# Patient Record
Sex: MALE | Race: WHITE | NOT HISPANIC OR LATINO | Employment: FULL TIME | URBAN - METROPOLITAN AREA
[De-identification: names, ages, dates, MRNs, and addresses within clinical notes are randomized per-mention and may not be internally consistent; named-entity substitution may affect disease eponyms.]

---

## 2019-10-28 ENCOUNTER — OFFICE VISIT CONVERTED (OUTPATIENT)
Dept: ORTHOPEDIC SURGERY | Facility: CLINIC | Age: 59
End: 2019-10-28
Attending: ORTHOPAEDIC SURGERY

## 2021-05-15 VITALS — WEIGHT: 201.12 LBS | OXYGEN SATURATION: 98 % | HEART RATE: 84 BPM | HEIGHT: 68 IN | BODY MASS INDEX: 30.48 KG/M2

## 2022-01-14 ENCOUNTER — OFFICE VISIT (OUTPATIENT)
Dept: ORTHOPEDIC SURGERY | Facility: CLINIC | Age: 62
End: 2022-01-14

## 2022-01-14 VITALS — BODY MASS INDEX: 32.43 KG/M2 | HEIGHT: 68 IN | HEART RATE: 64 BPM | WEIGHT: 214 LBS | OXYGEN SATURATION: 98 %

## 2022-01-14 DIAGNOSIS — M25.512 LEFT SHOULDER PAIN, UNSPECIFIED CHRONICITY: Primary | ICD-10-CM

## 2022-01-14 PROCEDURE — 99213 OFFICE O/P EST LOW 20 MIN: CPT | Performed by: ORTHOPAEDIC SURGERY

## 2022-01-14 PROCEDURE — 20610 DRAIN/INJ JOINT/BURSA W/O US: CPT | Performed by: ORTHOPAEDIC SURGERY

## 2022-01-14 RX ORDER — ASPIRIN 81 MG/1
81 TABLET ORAL DAILY
COMMUNITY

## 2022-01-14 RX ORDER — TRIAMCINOLONE ACETONIDE 40 MG/ML
40 INJECTION, SUSPENSION INTRA-ARTICULAR; INTRAMUSCULAR
Status: COMPLETED | OUTPATIENT
Start: 2022-01-14 | End: 2022-01-14

## 2022-01-14 RX ORDER — BUSPIRONE HYDROCHLORIDE 10 MG/1
10 TABLET ORAL AS NEEDED
COMMUNITY
Start: 2021-11-27

## 2022-01-14 RX ORDER — OLMESARTAN MEDOXOMIL AND HYDROCHLOROTHIAZIDE 40/12.5 40; 12.5 MG/1; MG/1
1 TABLET ORAL DAILY
COMMUNITY

## 2022-01-14 RX ORDER — BUPIVACAINE HYDROCHLORIDE 2.5 MG/ML
5 INJECTION, SOLUTION INFILTRATION; PERINEURAL
Status: COMPLETED | OUTPATIENT
Start: 2022-01-14 | End: 2022-01-14

## 2022-01-14 RX ORDER — OMEPRAZOLE 20 MG/1
20 CAPSULE, DELAYED RELEASE ORAL AS NEEDED
COMMUNITY
Start: 2021-11-27

## 2022-01-14 RX ADMIN — BUPIVACAINE HYDROCHLORIDE 5 ML: 2.5 INJECTION, SOLUTION INFILTRATION; PERINEURAL at 14:38

## 2022-01-14 RX ADMIN — TRIAMCINOLONE ACETONIDE 40 MG: 40 INJECTION, SUSPENSION INTRA-ARTICULAR; INTRAMUSCULAR at 14:38

## 2022-01-14 NOTE — PROGRESS NOTES
"Chief Complaint  Pain of the Left Shoulder     Subjective      Rommel Cadet presents to [facility] for an evaluation of left shoulder. Patient states he has pain in the shoulder that radiates down the upper arm. He has pain with internal and external rotation. He has had on and off pain for 4-5 months. He denies any injury or trauma to the shoulder.     No Known Allergies     Social History     Socioeconomic History   • Marital status:    Tobacco Use   • Smoking status: Former Smoker   • Smokeless tobacco: Never Used   Vaping Use   • Vaping Use: Never used        Review of Systems     Objective   Vital Signs:   Pulse 64   Ht 172.7 cm (68\")   Wt 97.1 kg (214 lb)   SpO2 98%   BMI 32.54 kg/m²       Physical Exam  Constitutional:       Appearance: Normal appearance. Patient is well-developed and normal weight.   HENT:      Head: Normocephalic.      Right Ear: Hearing and external ear normal.      Left Ear: Hearing and external ear normal.      Nose: Nose normal.   Eyes:      Conjunctiva/sclera: Conjunctivae normal.   Cardiovascular:      Rate and Rhythm: Normal rate.   Pulmonary:      Effort: Pulmonary effort is normal.      Breath sounds: No wheezing or rales.   Abdominal:      Palpations: Abdomen is soft.      Tenderness: There is no abdominal tenderness.   Musculoskeletal:      Cervical back: Normal range of motion.   Skin:     Findings: No rash.   Neurological:      Mental Status: Patient  is alert and oriented to person, place, and time.   Psychiatric:         Mood and Affect: Mood and affect normal.         Judgment: Judgment normal.       Ortho Exam      LEFT SHOULDER: Full forward elevation. Pain with empty can testing. Good tone of deltoid, biceps, triceps, wrist extensors, and wrist flexors.  Sensation grossly intact. Neurovascular intact.  Radial pulse 2+, ulnar pulse 2+. No swelling, skin discoloration or atrophy. Full elbow flexion and extension. 4/5 strength. IR to " L5. Abduction to 120 degrees. Impingement signs.       Large Joint Arthrocentesis: L subacromial bursa  Date/Time: 1/14/2022 2:38 PM  Consent given by: patient  Site marked: site marked  Timeout: Immediately prior to procedure a time out was called to verify the correct patient, procedure, equipment, support staff and site/side marked as required   Supporting Documentation  Indications: pain   Procedure Details  Location: shoulder - L subacromial bursa  Needle size: 22 G  Medications administered: 5 mL bupivacaine 0.25 %; 40 mg triamcinolone acetonide 40 MG/ML  Patient tolerance: patient tolerated the procedure well with no immediate complications              Imaging Results (Most Recent)     Procedure Component Value Units Date/Time    XR Shoulder 2+ View Left [[id number]] Resulted: 01/14/22 1403     Updated: 01/14/22 1406           Result Review :     X-Ray Report:  Left shoulder(s) X-Ray  Indication: Evaluation of left shoulder pain   AP and Lateral view(s)  Findings: No significant degenerative changes of the glenohumeral joint. Mild arthritis of the AC joint. No fracture or dislocation.   Prior studies available for comparison: no     Assessment and Plan     DX: Left shoulder pain     Discussed treatment plans and diagnosis with the patient. MRI vs injections discussed. Patient opted for a left shoulder injection, he tolerated this well. If symptoms persist, an MRI may be considered.     Call or return if worsening symptoms.    Follow Up     4-6 weeks.       Patient was given instructions and counseling regarding his condition or for health maintenance advice. Please see specific information pulled into the AVS if appropriate.     Scribed for Leodan Kowalski MD by Alyssa Hutchins.  01/14/22   14:16 EST    I have personally performed the services described in this document as scribed by the above individual and it is both accurate and complete. Leodan Kowalski MD 01/14/22

## 2022-03-15 ENCOUNTER — PREP FOR SURGERY (OUTPATIENT)
Dept: OTHER | Facility: HOSPITAL | Age: 62
End: 2022-03-15

## 2022-03-15 ENCOUNTER — OFFICE VISIT (OUTPATIENT)
Dept: SURGERY | Facility: CLINIC | Age: 62
End: 2022-03-15

## 2022-03-15 VITALS — RESPIRATION RATE: 16 BRPM | WEIGHT: 211 LBS | HEIGHT: 68 IN | BODY MASS INDEX: 31.98 KG/M2

## 2022-03-15 DIAGNOSIS — R10.13 EPIGASTRIC ABDOMINAL PAIN: Primary | ICD-10-CM

## 2022-03-15 PROCEDURE — 99203 OFFICE O/P NEW LOW 30 MIN: CPT | Performed by: SURGERY

## 2022-03-15 RX ORDER — SODIUM CHLORIDE 0.9 % (FLUSH) 0.9 %
10 SYRINGE (ML) INJECTION EVERY 12 HOURS SCHEDULED
Status: CANCELLED | OUTPATIENT
Start: 2022-03-15

## 2022-03-15 RX ORDER — SODIUM CHLORIDE 0.9 % (FLUSH) 0.9 %
10 SYRINGE (ML) INJECTION AS NEEDED
Status: CANCELLED | OUTPATIENT
Start: 2022-03-15

## 2022-03-15 NOTE — H&P (VIEW-ONLY)
"Chief Complaint: Skin Ulcer (Self referral/)    Subjective         History of Present Illness  Rommel Cadet is a 61 y.o. male presents to [facility]. The patient is to be seen as a referral for a possible ulcer. Patient consents to being recorded using TESS    Ulcer  Patient states about 2 weeks ago he had a lot of pain in his stomach. Due to the pain he ate \"a bunch\" of Tums. The following morning he had vomiting. He reports the vomit was red but he was unsure if it was due to the color of the Tums or not. Three days afterwards and the patient report he has a some abdominal pain and swelling after he eats. He denies after eating he feels sick to his stomach or nauseated. He states his pain radiates more on the right side. He denies any foods that cause more pain then others. He reports he eats a lot of salad. He states the last few days he has not has any of the pain. He denies any diarrhea or blood in stool.  He states he will sometimes get heartburn and denies ever having an upper scope procedure.    Surgical history  He reports having a colonoscopy about 10 years ago, and an inguinal hernia repair about 30 years ago.    Colon cancer screen  He states he now uses Cologuard. Which was negative last fall.     Medication  He states he is just on blood pressure medication and denies taking any blood thinners.    Objective     Past Medical History:   Diagnosis Date   • Hypertension        Past Surgical History:   Procedure Laterality Date   • COLONOSCOPY     • HERNIA REPAIR           Current Outpatient Medications:   •  aspirin 81 MG EC tablet, , Disp: , Rfl:   •  diclofenac (VOLTAREN) 50 MG EC tablet, , Disp: , Rfl:   •  olmesartan-hydrochlorothiazide (BENICAR HCT) 40-12.5 MG per tablet, Benicar HCT 40-12.5 mg oral tablet take 1 tablet by oral route once daily   Active, Disp: , Rfl:   •  omeprazole (priLOSEC) 20 MG capsule, , Disp: , Rfl:   •  busPIRone (BUSPAR) 10 MG tablet, , Disp: " ", Rfl:     No Known Allergies     Family History   Problem Relation Age of Onset   • Cancer Mother        Social History     Socioeconomic History   • Marital status:    Tobacco Use   • Smoking status: Former Smoker   • Smokeless tobacco: Never Used   Vaping Use   • Vaping Use: Never used       Vital Signs:   Resp 16   Ht 172.7 cm (68\")   Wt 95.7 kg (211 lb)   BMI 32.08 kg/m²      Physical Exam  Constitutional:       Appearance: Normal appearance. He is well-developed and normal weight.   Cardiovascular:      Heart sounds: No murmur heard.  Pulmonary:      Effort: Pulmonary effort is normal.      Breath sounds: Normal air entry.   Neurological:      Mental Status: He is alert and oriented to person, place, and time.      Motor: Motor function is intact.          Result Review :            Procedures        Assessment and Plan    Diagnoses and all orders for this visit:    1. Epigastric abdominal pain (Primary)  Assessment & Plan:  - Will preform EGD and the risks, benefits, alternatives were discussed extensively. All questions were answered.        Follow Up   No follow-ups on file.  Patient was given instructions and counseling regarding his condition or for health maintenance advice. Please see specific information pulled into the AVS if appropriate.       Transcribed from ambient dictation for Tien Koo MD by Kristal Bauer.  03/15/22   19:31 EDT    Patient verbalized consent to the visit recording.  I have personally performed the services described in this document as transcribed by the above individual, and it is both accurate and complete.  Tien Koo MD  3/16/2022  20:47 EDT  "

## 2022-03-15 NOTE — ASSESSMENT & PLAN NOTE
- Will preform EGD and the risks, benefits, alternatives were discussed extensively. All questions were answered.

## 2022-03-15 NOTE — PROGRESS NOTES
"Chief Complaint: Skin Ulcer (Self referral/)    Subjective         History of Present Illness  Rommel Cadet is a 61 y.o. male presents to [facility]. The patient is to be seen as a referral for a possible ulcer. Patient consents to being recorded using TESS    Ulcer  Patient states about 2 weeks ago he had a lot of pain in his stomach. Due to the pain he ate \"a bunch\" of Tums. The following morning he had vomiting. He reports the vomit was red but he was unsure if it was due to the color of the Tums or not. Three days afterwards and the patient report he has a some abdominal pain and swelling after he eats. He denies after eating he feels sick to his stomach or nauseated. He states his pain radiates more on the right side. He denies any foods that cause more pain then others. He reports he eats a lot of salad. He states the last few days he has not has any of the pain. He denies any diarrhea or blood in stool.  He states he will sometimes get heartburn and denies ever having an upper scope procedure.    Surgical history  He reports having a colonoscopy about 10 years ago, and an inguinal hernia repair about 30 years ago.    Colon cancer screen  He states he now uses Cologuard. Which was negative last fall.     Medication  He states he is just on blood pressure medication and denies taking any blood thinners.    Objective     Past Medical History:   Diagnosis Date   • Hypertension        Past Surgical History:   Procedure Laterality Date   • COLONOSCOPY     • HERNIA REPAIR           Current Outpatient Medications:   •  aspirin 81 MG EC tablet, , Disp: , Rfl:   •  diclofenac (VOLTAREN) 50 MG EC tablet, , Disp: , Rfl:   •  olmesartan-hydrochlorothiazide (BENICAR HCT) 40-12.5 MG per tablet, Benicar HCT 40-12.5 mg oral tablet take 1 tablet by oral route once daily   Active, Disp: , Rfl:   •  omeprazole (priLOSEC) 20 MG capsule, , Disp: , Rfl:   •  busPIRone (BUSPAR) 10 MG tablet, , Disp: " ", Rfl:     No Known Allergies     Family History   Problem Relation Age of Onset   • Cancer Mother        Social History     Socioeconomic History   • Marital status:    Tobacco Use   • Smoking status: Former Smoker   • Smokeless tobacco: Never Used   Vaping Use   • Vaping Use: Never used       Vital Signs:   Resp 16   Ht 172.7 cm (68\")   Wt 95.7 kg (211 lb)   BMI 32.08 kg/m²      Physical Exam  Constitutional:       Appearance: Normal appearance. He is well-developed and normal weight.   Cardiovascular:      Heart sounds: No murmur heard.  Pulmonary:      Effort: Pulmonary effort is normal.      Breath sounds: Normal air entry.   Neurological:      Mental Status: He is alert and oriented to person, place, and time.      Motor: Motor function is intact.          Result Review :            Procedures        Assessment and Plan    Diagnoses and all orders for this visit:    1. Epigastric abdominal pain (Primary)  Assessment & Plan:  - Will preform EGD and the risks, benefits, alternatives were discussed extensively. All questions were answered.        Follow Up   No follow-ups on file.  Patient was given instructions and counseling regarding his condition or for health maintenance advice. Please see specific information pulled into the AVS if appropriate.       Transcribed from ambient dictation for Tien Koo MD by Kristal Bauer.  03/15/22   19:31 EDT    Patient verbalized consent to the visit recording.  I have personally performed the services described in this document as transcribed by the above individual, and it is both accurate and complete.  Tien Koo MD  3/16/2022  20:47 EDT  "

## 2022-03-21 NOTE — PRE-PROCEDURE INSTRUCTIONS
Pt and wife sam  instructed where to come to and nothing to eat or drink after midnight no meds  and arrival time is 0630 am on wed. March 23 pt has been vaccinated wife provided information . She said she has had this egd done before. No questions

## 2022-03-23 ENCOUNTER — ANESTHESIA (OUTPATIENT)
Dept: GASTROENTEROLOGY | Facility: HOSPITAL | Age: 62
End: 2022-03-23

## 2022-03-23 ENCOUNTER — ANESTHESIA EVENT (OUTPATIENT)
Dept: GASTROENTEROLOGY | Facility: HOSPITAL | Age: 62
End: 2022-03-23

## 2022-03-23 ENCOUNTER — HOSPITAL ENCOUNTER (OUTPATIENT)
Facility: HOSPITAL | Age: 62
Setting detail: HOSPITAL OUTPATIENT SURGERY
Discharge: HOME | End: 2022-03-23
Attending: SURGERY | Admitting: SURGERY

## 2022-03-23 VITALS
HEIGHT: 68 IN | OXYGEN SATURATION: 95 % | DIASTOLIC BLOOD PRESSURE: 78 MMHG | SYSTOLIC BLOOD PRESSURE: 122 MMHG | BODY MASS INDEX: 31.54 KG/M2 | HEART RATE: 75 BPM | TEMPERATURE: 97.5 F | RESPIRATION RATE: 16 BRPM | WEIGHT: 208.11 LBS

## 2022-03-23 DIAGNOSIS — R10.13 EPIGASTRIC ABDOMINAL PAIN: ICD-10-CM

## 2022-03-23 PROCEDURE — 43239 EGD BIOPSY SINGLE/MULTIPLE: CPT | Performed by: SURGERY

## 2022-03-23 PROCEDURE — 25010000002 PROPOFOL 10 MG/ML EMULSION: Performed by: NURSE ANESTHETIST, CERTIFIED REGISTERED

## 2022-03-23 PROCEDURE — 88305 TISSUE EXAM BY PATHOLOGIST: CPT | Performed by: SURGERY

## 2022-03-23 RX ORDER — SODIUM CHLORIDE, SODIUM LACTATE, POTASSIUM CHLORIDE, CALCIUM CHLORIDE 600; 310; 30; 20 MG/100ML; MG/100ML; MG/100ML; MG/100ML
30 INJECTION, SOLUTION INTRAVENOUS CONTINUOUS
Status: DISCONTINUED | OUTPATIENT
Start: 2022-03-23 | End: 2022-03-23 | Stop reason: HOSPADM

## 2022-03-23 RX ORDER — LIDOCAINE HYDROCHLORIDE 20 MG/ML
INJECTION, SOLUTION INFILTRATION; PERINEURAL AS NEEDED
Status: DISCONTINUED | OUTPATIENT
Start: 2022-03-23 | End: 2022-03-23 | Stop reason: SURG

## 2022-03-23 RX ORDER — SODIUM CHLORIDE 0.9 % (FLUSH) 0.9 %
10 SYRINGE (ML) INJECTION EVERY 12 HOURS SCHEDULED
Status: DISCONTINUED | OUTPATIENT
Start: 2022-03-23 | End: 2022-03-23 | Stop reason: HOSPADM

## 2022-03-23 RX ORDER — SODIUM CHLORIDE 0.9 % (FLUSH) 0.9 %
10 SYRINGE (ML) INJECTION AS NEEDED
Status: DISCONTINUED | OUTPATIENT
Start: 2022-03-23 | End: 2022-03-23 | Stop reason: HOSPADM

## 2022-03-23 RX ORDER — PROPOFOL 10 MG/ML
VIAL (ML) INTRAVENOUS AS NEEDED
Status: DISCONTINUED | OUTPATIENT
Start: 2022-03-23 | End: 2022-03-23 | Stop reason: SURG

## 2022-03-23 RX ORDER — SODIUM CHLORIDE, SODIUM LACTATE, POTASSIUM CHLORIDE, CALCIUM CHLORIDE 600; 310; 30; 20 MG/100ML; MG/100ML; MG/100ML; MG/100ML
INJECTION, SOLUTION INTRAVENOUS CONTINUOUS PRN
Status: DISCONTINUED | OUTPATIENT
Start: 2022-03-23 | End: 2022-03-23 | Stop reason: SURG

## 2022-03-23 RX ADMIN — PROPOFOL 100 MG: 10 INJECTION, EMULSION INTRAVENOUS at 08:08

## 2022-03-23 RX ADMIN — PROPOFOL 100 MG: 10 INJECTION, EMULSION INTRAVENOUS at 08:05

## 2022-03-23 RX ADMIN — SODIUM CHLORIDE, POTASSIUM CHLORIDE, SODIUM LACTATE AND CALCIUM CHLORIDE: 600; 310; 30; 20 INJECTION, SOLUTION INTRAVENOUS at 07:53

## 2022-03-23 RX ADMIN — PROPOFOL 50 MG: 10 INJECTION, EMULSION INTRAVENOUS at 08:15

## 2022-03-23 RX ADMIN — PROPOFOL 100 MG: 10 INJECTION, EMULSION INTRAVENOUS at 07:59

## 2022-03-23 RX ADMIN — LIDOCAINE HYDROCHLORIDE 100 MG: 20 INJECTION, SOLUTION INFILTRATION; PERINEURAL at 07:59

## 2022-03-23 RX ADMIN — SODIUM CHLORIDE, POTASSIUM CHLORIDE, SODIUM LACTATE AND CALCIUM CHLORIDE 30 ML/HR: 600; 310; 30; 20 INJECTION, SOLUTION INTRAVENOUS at 07:34

## 2022-03-23 NOTE — OP NOTE
EGD notes    Patient had some oxygen desaturation and possible laryngeal spasm during the procedure  He was placed on his back and bagged  He recovered appropriately  The case was resumed  He still had some low sats during the case  We took biopsies and visualize as much as we could but we did truncate the procedure secondary to his desaturations

## 2022-03-23 NOTE — ANESTHESIA PREPROCEDURE EVALUATION
Anesthesia Evaluation     Patient summary reviewed and Nursing notes reviewed   no history of anesthetic complications:  NPO Solid Status: > 8 hours  NPO Liquid Status: > 2 hours           Airway   Mallampati: II  TM distance: >3 FB  Neck ROM: full  No difficulty expected  Dental      Pulmonary - negative pulmonary ROS and normal exam    breath sounds clear to auscultation  Cardiovascular - normal exam  Exercise tolerance: good (4-7 METS)    Rhythm: regular  Rate: normal    (+) hypertension,       Neuro/Psych- negative ROS  GI/Hepatic/Renal/Endo - negative ROS     Musculoskeletal (-) negative ROS    Abdominal    Substance History - negative use     OB/GYN negative ob/gyn ROS         Other - negative ROS                       Anesthesia Plan    ASA 2     general   (Total IV Anesthesia    Patient understands anesthesia not responsible for dental damage.  )  intravenous induction     Anesthetic plan, all risks, benefits, and alternatives have been provided, discussed and informed consent has been obtained with: patient.    Plan discussed with CRNA.        CODE STATUS:

## 2022-03-23 NOTE — ANESTHESIA POSTPROCEDURE EVALUATION
Patient: Rommel Cadet    Procedure Summary     Date: 03/23/22 Room / Location: [facility] ENDOSCOPY 3 / [facility] ENDOSCOPY    Anesthesia Start: 0755 Anesthesia Stop: 0833    Procedure: ESOPHAGOGASTRODUODENOSCOPY WITH BIOPSIES (N/A ) Diagnosis:       Epigastric abdominal pain      (Epigastric abdominal pain [R10.13])    Surgeons: Tien Koo MD Provider: Shailesh Dominguez MD    Anesthesia Type: general ASA Status: 2          Anesthesia Type: general    Vitals  Vitals Value Taken Time   /78 03/23/22 0845   Temp 36.4 °C (97.5 °F) 03/23/22 0845   Pulse 75 03/23/22 0845   Resp 16 03/23/22 0845   SpO2 95 % 03/23/22 0845           Post Anesthesia Care and Evaluation    Patient location during evaluation: bedside  Patient participation: complete - patient participated  Level of consciousness: awake and alert  Pain management: adequate  Airway patency: patent  Anesthetic complications: No anesthetic complications  PONV Status: none  Cardiovascular status: acceptable  Respiratory status: acceptable  Hydration status: acceptable    Comments: An Anesthesiologist personally participated in the most demanding procedures (including induction and emergence if applicable) in the anesthesia plan, monitored the course of anesthesia administration at frequent intervals and remained physically present and available for immediate diagnosis and treatment of emergencies.

## 2022-03-23 NOTE — DISCHARGE INSTRUCTIONS
May resume activity on 3/24/22 at 0830.  Make sure to eat softer foods for your first meal to ensure not to bother throat.  Stay away from nuts, crackers, raw vegetables, and large chunks of meat today.  Coughing a little bit of blood is normal.  IF YOU ARE COUGHING UP CLOTS OR A LOT OF BLOOD, CALL 911 AND/OR GO TO YOUR NEAREST EMERGENCY ROOM.    NO DRIVING, SIGNING LEGAL DOCUMENTS, OR ALCOHOL CONSUMPTION FOR 24 HOURS.

## 2022-03-24 LAB
CYTO UR: NORMAL
LAB AP CASE REPORT: NORMAL
LAB AP CLINICAL INFORMATION: NORMAL
PATH REPORT.FINAL DX SPEC: NORMAL
PATH REPORT.GROSS SPEC: NORMAL

## 2022-03-31 ENCOUNTER — TELEPHONE (OUTPATIENT)
Dept: SURGERY | Facility: CLINIC | Age: 62
End: 2022-03-31

## 2022-03-31 NOTE — TELEPHONE ENCOUNTER
Pts wife called wanting to know if  was going to call with EGD results or if they were going to be lily a f/u appt. She stated the hospital did not make them an appt. She asked if they were suppose to and I said they normally do. Please call pt and advise.

## 2022-04-12 ENCOUNTER — OFFICE VISIT (OUTPATIENT)
Dept: SURGERY | Facility: CLINIC | Age: 62
End: 2022-04-12

## 2022-04-12 VITALS — RESPIRATION RATE: 18 BRPM | BODY MASS INDEX: 33.27 KG/M2 | HEIGHT: 67 IN | WEIGHT: 212 LBS | HEART RATE: 89 BPM

## 2022-04-12 DIAGNOSIS — K21.00 GASTROESOPHAGEAL REFLUX DISEASE WITH ESOPHAGITIS WITHOUT HEMORRHAGE: Primary | ICD-10-CM

## 2022-04-12 DIAGNOSIS — K44.9 HIATAL HERNIA: ICD-10-CM

## 2022-04-12 PROCEDURE — 99213 OFFICE O/P EST LOW 20 MIN: CPT | Performed by: NURSE PRACTITIONER

## 2022-04-12 RX ORDER — ROSUVASTATIN CALCIUM 10 MG/1
TABLET, COATED ORAL
COMMUNITY
Start: 2022-03-17

## 2022-04-12 RX ORDER — FAMOTIDINE 40 MG/1
40 TABLET, FILM COATED ORAL 2 TIMES DAILY
Qty: 30 TABLET | Refills: 1 | Status: SHIPPED | OUTPATIENT
Start: 2022-04-12 | End: 2023-04-12

## 2022-04-12 NOTE — PROGRESS NOTES
Chief Complaint: Post-op Follow-up    Subjective      Follow-up visit       History of Present Illness  Rommel Cadet is a 61 y.o. male presents to [facility] for a follow-up visit after undergoing an EGD.    Patient was with a medium size hiatal hernia and and squamous mucosa with mild changes suggestive of reflux esophagitis per EGD/pathology.    Pathology:  Clinical Information    Comment:    Epigastric abdominal pain      Final Diagnosis   1. Stomach, antrum, biopsy:                - Gastric antrum mucosa with mild chronic inactive gastritis                - Negative for Helicobacter pylori on routine H&E stain                - Negative for intestinal metaplasia, dysplasia and malignancy        2. Gastroesophageal junction, biopsy:                - Squamous mucosa with mild changes suggestive of reflux esophagitis                - Negative for intestinal metaplasia, dysplasia and malignancy            Electronically signed by Shailesh Tate MD      Scope was technically difficult and complex due to secondary to patient's oxygen desaturation.  Hope was successfully completed.  Did require bagging.    Patient denies any postoperative complications.       Objective     Past Medical History:   Diagnosis Date   • Hypertension        Past Surgical History:   Procedure Laterality Date   • COLONOSCOPY     • ENDOSCOPY N/A 3/23/2022    Procedure: ESOPHAGOGASTRODUODENOSCOPY WITH BIOPSIES;  Surgeon: Tien Koo MD;  Location: [facility] ENDOSCOPY;  Service: General;  Laterality: N/A;  HIATAL HERNIA, ESOPHAGEAL ULCERATION   • HERNIA REPAIR         Outpatient Medications Marked as Taking for the 4/12/22 encounter (Office Visit) with Anali Ramos, APRDIEGO   Medication Sig Dispense Refill   • aspirin 81 MG EC tablet Take 81 mg by mouth Daily. Last dose of aspirin 1 week ago     • busPIRone (BUSPAR) 10 MG tablet Take 10 mg by mouth As Needed.     • olmesartan-hydrochlorothiazide (BENICAR HCT)  "40-12.5 MG per tablet Take 1 tablet by mouth Daily.     • omeprazole (priLOSEC) 20 MG capsule Take 20 mg by mouth As Needed.         No Known Allergies     Family History   Problem Relation Age of Onset   • Cancer Mother    • Malig Hyperthermia Neg Hx        Social History     Socioeconomic History   • Marital status:    Tobacco Use   • Smoking status: Former Smoker     Packs/day: 0.50     Years: 30.00     Pack years: 15.00     Quit date:      Years since quittin.2   • Smokeless tobacco: Never Used   Vaping Use   • Vaping Use: Never used   Substance and Sexual Activity   • Alcohol use: Not Currently     Comment: socially   • Drug use: Never   • Sexual activity: Defer       Review of Systems   Constitutional: Negative for chills and fever.   Gastrointestinal: Negative for nausea, vomiting, GERD and indigestion.        Vital Signs:   Pulse 89   Resp 18   Ht 170.2 cm (67\")   Wt 96.2 kg (212 lb)   BMI 33.20 kg/m²      Physical Exam  Constitutional:       Appearance: Normal appearance.   HENT:      Head: Normocephalic.   Cardiovascular:      Rate and Rhythm: Normal rate.   Pulmonary:      Effort: Pulmonary effort is normal.   Abdominal:      Palpations: Abdomen is soft.   Skin:     General: Skin is warm and dry.   Neurological:      General: No focal deficit present.      Mental Status: He is alert and oriented to person, place, and time.   Psychiatric:         Mood and Affect: Mood normal.         Thought Content: Thought content normal.          Result Review :          []  Laboratory  []  Radiology  [x]  Pathology  []  Microbiology  []  EKG/Telemetry   []  Cardiology/Vascular   [x]  Old records  Today I reviewed Dr. Koo's EGD and pathology.     Assessment and Plan    Diagnoses and all orders for this visit:    1. Gastroesophageal reflux disease with esophagitis without hemorrhage (Primary)    2. Hiatal hernia    Other orders  -     famotidine (PEPCID) 40 MG tablet; Take 1 tablet by mouth 2 " (Two) Times a Day.  Dispense: 30 tablet; Refill: 1        Follow Up   Return if symptoms worsen or fail to improve.     Maintain healthy weight  Avoid lying down after meals  Avoid eating late at night  Elevate head of the bed 6 inches  Avoid wearing tight fitting clothes    Coutinue with PPI or H2 blockers as prescribed.    Patient was given instructions and counseling regarding his condition or for health maintenance advice. Please see specific information pulled into the AVS if appropriate.

## 2022-04-25 ENCOUNTER — TELEPHONE (OUTPATIENT)
Dept: ORTHOPEDIC SURGERY | Facility: CLINIC | Age: 62
End: 2022-04-25

## 2022-04-25 NOTE — TELEPHONE ENCOUNTER
Provider: BEEN  Caller: NEGRITO ALFARO  Relationship to Patient: PATIENT  Pharmacy: N/A  Phone Number: [phone]  Reason for Call: PATIENT CALLING TO SCHEDULE INJECTION LT SHLDR  When was the patient last seen: 1.14.22

## 2022-04-27 ENCOUNTER — OFFICE VISIT (OUTPATIENT)
Dept: ORTHOPEDIC SURGERY | Facility: CLINIC | Age: 62
End: 2022-04-27

## 2022-04-27 VITALS — WEIGHT: 215.2 LBS | BODY MASS INDEX: 32.61 KG/M2 | HEIGHT: 68 IN | OXYGEN SATURATION: 98 % | HEART RATE: 75 BPM

## 2022-04-27 DIAGNOSIS — M25.512 LEFT SHOULDER PAIN, UNSPECIFIED CHRONICITY: Primary | ICD-10-CM

## 2022-04-27 PROCEDURE — 20610 DRAIN/INJ JOINT/BURSA W/O US: CPT | Performed by: PHYSICIAN ASSISTANT

## 2022-04-27 RX ORDER — TRIAMCINOLONE ACETONIDE 40 MG/ML
40 INJECTION, SUSPENSION INTRA-ARTICULAR; INTRAMUSCULAR
Status: COMPLETED | OUTPATIENT
Start: 2022-04-27 | End: 2022-04-27

## 2022-04-27 RX ORDER — LIDOCAINE HYDROCHLORIDE 10 MG/ML
5 INJECTION, SOLUTION INFILTRATION; PERINEURAL
Status: COMPLETED | OUTPATIENT
Start: 2022-04-27 | End: 2022-04-27

## 2022-04-27 RX ORDER — FLUOROURACIL 50 MG/G
CREAM TOPICAL
COMMUNITY
Start: 2022-04-13

## 2022-04-27 RX ADMIN — LIDOCAINE HYDROCHLORIDE 5 ML: 10 INJECTION, SOLUTION INFILTRATION; PERINEURAL at 07:29

## 2022-04-27 RX ADMIN — TRIAMCINOLONE ACETONIDE 40 MG: 40 INJECTION, SUSPENSION INTRA-ARTICULAR; INTRAMUSCULAR at 07:29

## 2022-04-27 NOTE — PATIENT INSTRUCTIONS
Patient elected to receive left shoulder steroid injection, risks and benefits discussed and he tolerated this well.  He would like to follow-up as needed.

## 2022-04-27 NOTE — PROGRESS NOTES
"Chief Complaint  Pain and Follow-up of the Left Shoulder    Subjective          Rommel Cadet presents to [facility] for follow-up on left shoulder pain.  Pain is on the lateral shoulder radiating down the arm.  Has a lot of pain with internal and external rotation.  He finds it difficult to put on a shirt.  Pain has been present for about 6 to 7 months, no known injury.  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  Requesting a steroid injection today.  Last steroid injection performed 1/14/2022 provided good relief up until recently.    Objective   No Known Allergies    Vital Signs:   Pulse 75   Ht 172.7 cm (68\")   Wt 97.6 kg (215 lb 3.2 oz)   SpO2 98%   BMI 32.72 kg/m²       Physical Exam  Constitutional:       Appearance: Normal appearance. Patient is well-developed and normal weight.   HENT:      Head: Normocephalic.      Right Ear: Hearing and external ear normal.      Left Ear: Hearing and external ear normal.      Nose: Nose normal.   Eyes:      Conjunctiva/sclera: Conjunctivae normal.   Cardiovascular:      Rate and Rhythm: Normal rate.   Pulmonary:      Effort: Pulmonary effort is normal.      Breath sounds: No wheezing or rales.   Abdominal:      Palpations: Abdomen is soft.      Tenderness: There is no abdominal tenderness.   Musculoskeletal:      Cervical back: Normal range of motion.   Skin:     Findings: No rash.   Neurological:      Mental Status: Patient is alert and oriented to person, place, and time.   Psychiatric:         Mood and Affect: Mood and affect normal.         Judgment: Judgment normal.     Ortho Exam  Left shoulder: Skin intact, no swelling, tenderness about the humeral head, crepitus with range of motion, near full flexion and abduction, painful internal and external rotation.  Sensation and pulses intact distally, good range of motion elbow wrist and digits  Result Review :            Imaging Results (Most Recent)     None       "   Large Joint Arthrocentesis: L subacromial bursa  Date/Time: 4/27/2022 7:29 AM  Consent given by: patient  Site marked: site marked  Timeout: Immediately prior to procedure a time out was called to verify the correct patient, procedure, equipment, support staff and site/side marked as required   Supporting Documentation  Indications: pain   Procedure Details  Location: shoulder - L subacromial bursa  Preparation: Patient was prepped and draped in the usual sterile fashion  Needle gauge: 21 G.  Approach: posterior  Medications administered: 5 mL lidocaine 1 %; 40 mg triamcinolone acetonide 40 MG/ML  Patient tolerance: patient tolerated the procedure well with no immediate complications            Assessment and Plan    Problem List Items Addressed This Visit        Musculoskeletal and Injuries    Left shoulder pain - Primary          Follow Up   Return if symptoms worsen or fail to improve.  Patient Instructions   Patient elected to receive left shoulder steroid injection, risks and benefits discussed and he tolerated this well.  He would like to follow-up as needed.    Patient was given instructions and counseling regarding his condition or for health maintenance advice. Please see specific information pulled into the AVS if appropriate.

## (undated) DEVICE — Device: Brand: DEFENDO AIR/WATER/SUCTION AND BIOPSY VALVE

## (undated) DEVICE — SINGLE-USE BIOPSY FORCEPS: Brand: RADIAL JAW 4

## (undated) DEVICE — SOL IRRG H2O PL/BG 1000ML STRL

## (undated) DEVICE — EGD OR ERCP KIT: Brand: MEDLINE INDUSTRIES, INC.